# Patient Record
Sex: FEMALE | Race: BLACK OR AFRICAN AMERICAN | Employment: STUDENT | URBAN - METROPOLITAN AREA
[De-identification: names, ages, dates, MRNs, and addresses within clinical notes are randomized per-mention and may not be internally consistent; named-entity substitution may affect disease eponyms.]

---

## 2017-11-14 ENCOUNTER — OFFICE VISIT (OUTPATIENT)
Dept: FAMILY MEDICINE CLINIC | Facility: CLINIC | Age: 22
End: 2017-11-14

## 2017-11-14 DIAGNOSIS — Z11.1 SCREENING FOR TUBERCULOSIS: Primary | ICD-10-CM

## 2017-11-14 PROCEDURE — 86580 TB INTRADERMAL TEST: CPT | Performed by: NURSE PRACTITIONER

## 2017-11-14 NOTE — PATIENT INSTRUCTIONS
You will need to return to clinic in 48-72 hours to have results of TB test read. Please return to clinic on 11/16/17 after 4:20pm or on 11/17/17 before 4:20pm to have your TB test read.     If you do not return during this time your test will need to b

## 2017-11-14 NOTE — PROGRESS NOTES
Jeremy Dennis is a 23year old female who presents for TB testing. TUBERCULOSIS SCREENING QUESTIONNAIRE    · Live vaccines in the past month? no  · Any steroid medication in the past month? no  · History of BCG vaccine?     no  · If fema

## 2017-11-16 ENCOUNTER — OFFICE VISIT (OUTPATIENT)
Dept: FAMILY MEDICINE CLINIC | Facility: CLINIC | Age: 22
End: 2017-11-16

## 2017-11-16 DIAGNOSIS — Z11.1 ENCOUNTER FOR PPD SKIN TEST READING: Primary | ICD-10-CM

## 2017-11-17 NOTE — PATIENT INSTRUCTIONS
11/16/17    Guera Delacruz  7/12/1995    This document is to verify Guera Delacruz had a TB skin test preformed and the results were: negative.     Date given: 11/14/17  Date read: 11/16/17        Please call the number listed ab

## 2017-11-17 NOTE — PROGRESS NOTES
Recent Results (from the past 24 hour(s))  -TB INTRADERMAL TEST   Collection Time: 11/16/17  6:39 PM   Result Value Ref Range   Gazelle: luis m bolivar    Date Given: 11/14/17    Date Resulted: 11/16/17    Date Read: 11/16/17    Site: left forearm    IN